# Patient Record
Sex: FEMALE | Race: WHITE
[De-identification: names, ages, dates, MRNs, and addresses within clinical notes are randomized per-mention and may not be internally consistent; named-entity substitution may affect disease eponyms.]

---

## 2018-06-09 ENCOUNTER — HOSPITAL ENCOUNTER (EMERGENCY)
Dept: HOSPITAL 25 - ED | Age: 58
Discharge: HOME | End: 2018-06-09
Payer: COMMERCIAL

## 2018-06-09 VITALS — DIASTOLIC BLOOD PRESSURE: 71 MMHG | SYSTOLIC BLOOD PRESSURE: 145 MMHG
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
## 2018-06-09 DIAGNOSIS — S52.511A: ICD-10-CM

## 2018-06-09 DIAGNOSIS — F17.200: ICD-10-CM

## 2018-06-09 DIAGNOSIS — I10: ICD-10-CM
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
## 2018-06-09 DIAGNOSIS — S52.572A: Primary | ICD-10-CM

## 2018-06-09 PROCEDURE — 93005 ELECTROCARDIOGRAM TRACING: CPT

## 2018-06-09 PROCEDURE — 99283 EMERGENCY DEPT VISIT LOW MDM: CPT

## 2018-06-09 NOTE — ED
Upper Extremity Pain





- HPI Summary


HPI Summary: 


57F presents with bilateral wrist injury today.  She was walking with his son 

and hand when she tripped and fell on her bilateral wrists.  There is deformity 

to right wrist.  She denies any numbness or tingling.  She denies any previous 

fracture to the area.  She also states she has minimal pain to her left wrist 

but has full range of motion..  She denies any elbow pain.  She denies any 

other injury.  She is right-handed.  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 

typing.  She has a history of high blood pressure.  She denies any chest pain 

shortness breath or headache.








- History of Current Complaint


Chief Complaint: EDExtremityUpper


Stated Complaint: FALL/WRIST INJURY


Time Seen by Provider: 06/09/18 17:37





- Allergies/Home Medications


Allergies/Adverse Reactions: 


 Allergies











Allergy/AdvReac Type Severity Reaction Status Date / Time


 


No Known Allergies Allergy   Verified 06/09/18 17:56











Home Medications: 


 Home Medications





Aspirin EC TAB* [Ecotrin EC Low Dose 81 MG*] 81 mg PO DAILY 06/09/18 [History 

Confirmed 06/09/18]


Cyanocobalamin TAB* [Vitamin B12 TAB*] 500 mcg PO DAILY 06/09/18 [History 

Confirmed 06/09/18]


Multivitamins/Minerals TAB* [Theragran/minerals TAB*] 1 tab PO DAILY 06/09/18 [

History Confirmed 06/09/18]


Omega-3 Fatty Acids (Nf) [Fish Oil (NF)] 1,000 mg PO DAILY 06/09/18 [History 

Confirmed 06/09/18]


Valsartan/HCTZ 320/12.5(NF) [Diovan Hct 320/12.5(NF)] 1 tab PO DAILY 06/09/18 [

History Confirmed 06/09/18]


amLODIPine TAB* [Norvasc 5 mg TAB*] 10 mg PO DAILY 06/09/18 [History Confirmed 

06/09/18]


hydrALAZINE TAB* [Apresoline TAB*] 10 mg PO BID 06/09/18 [History Confirmed 06/ 09/18]











PMH/Surg Hx/FS Hx/Imm Hx


Endocrine/Hematology History: 


   Denies: Hx Anticoagulant Therapy


Cardiovascular History: Reports: Hx Hypertension


Infectious Disease History: No


Infectious Disease History: 


   Denies: Traveled Outside the [place] in Last 30 Days





- Family History


Known Family History: Positive: Hypertension





- Social History


Alcohol Use: Occasionally


Substance Use Type: Reports: None


Smoking Status (MU): Light Every Day Tobacco Smoker





Review of Systems


Negative: Fever


Negative: Chest Pain


Negative: Shortness Of Breath


Positive: Myalgia - bilateral wrist pain


All Other Systems Reviewed And Are Negative: Yes





Physical Exam


Triage Information Reviewed: Yes


Vital Signs On Initial Exam: 


 Initial Vitals











Temp Pulse Resp BP Pulse Ox


 


 99.0 F   119   20   211/77   98 


 


 06/09/18 17:18  06/09/18 17:18  06/09/18 17:18  06/09/18 17:18  06/09/18 17:18











Vital Signs Reviewed: Yes


Appearance: Positive: Well-Appearing


Skin: Positive: Warm, Dry


Head/Face: Positive: Normal Head/Face Inspection


Eyes: Positive: Normal, Conjunctiva Clear


Respiratory/Lung Sounds: Positive: Clear to Auscultation, Breath Sounds Present


Cardiovascular: Positive: Normal, RRR


Musculoskeletal: Positive: Limited @ - right wrist, Other - good pulses, 

capillary refill<2 secs, deformity to right wrist, tenderness to left wrist 

with no deformity and full ROM


Neurological: Positive: Normal


Psychiatric: Positive: Normal





Procedures





- Splinting


  ** Left


Location: left wrist


Hand-Made Type: orthoglass


Splint: sugar-tong


Pre-Proc Neuro Vasc Exam: normal


Post-Proc Neuro Vasc Exam: normal





  ** right wrist


Location: right wrist


Hand-Made Type: orthoglass


Splint: sugar-tong


Pre-Proc Neuro Vasc Exam: normal


Post-Proc Neuro Vasc Exam: normal





- Joint Reduction


  ** Right


Joint Reduction Site: wrist (R)


Conscious Sedation: No


Reduction Attempts: 1 - finger traps and manual reduction attempted


Pre-Procedure NV Exam: Yes


Post Joint Reduction Film: joint not reduced - improved a little





Diagnostics





- Vital Signs


 Vital Signs











  Temp Pulse Resp BP Pulse Ox


 


 06/09/18 17:18  99.0 F  119  20  211/77  98














- Laboratory


Lab Statement: Any lab studies that have been ordered have been reviewed, and 

results considered in the medical decision making process.





- Radiology


  ** right wrist


Xray Interpretation: Positive (See Comments) - IMPRESSION: Distal radius and 

ulnar fractures.


Radiology Interpretation Completed By: Radiologist





  ** left wrist


Xray Interpretation: Positive (See Comments) - radius fracture


Radiology Interpretation Completed By: Radiologist





- EKG


  ** No standard instances


EKG Rhythm: Sinus Tachycardia


ST Segment: Normal


EKG Interpretation: sinus tachycardia





Course/Dx





- Course


Course Of Treatment: 57F presents with bilateral wrist injury today.  She was 

walking with his son and hand when she tripped and fell on her bilateral 

wrists.  There is deformity to right wrist.  She denies any numbness or 

tingling.  She denies any previous fracture to the area.  She also states she 

has minimal pain to her left wrist but has full range of motion..  She denies 

any elbow pain.  She denies any other injury.  She is right-handed.  She works 

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  She has a history of high blood pressure.  

She denies any chest pain shortness breath or headache.  Initial presentation 

patient was tachycardic and had a high blood pressure.  EKG was performed and 

shows sinus tachycardia.  In the course the ED patient's blood pressure came 

down was likely due to pain.  On exam has deformity noted to right wrist.  

Neurovascular intact on both wrists.  Has full range of motion of left versus 

pain.  X-ray shows displaced fracture of right radius.  Spoke with Dr. Foster 

and states patient will likely need surgery.  Place in finger traps and 

attempted manual reduction after hematoma block and only minimal reduction 

occurred.  X-ray of left radius shows a nondisplaced fracture.  Placed both 

wrists in splint.  Will follow-up with orthopedic.  Told to practice rice.  

Patient understands the plan.





- Diagnoses


Differential Diagnosis/HQI/PQRI: Positive: Fracture (Closed), Strain, Sprain


Provider Diagnoses: 


 Wrist fracture, bilateral, Hypertension








Discharge





- Sign-Out/Discharge


Documenting (check all that apply): Discharge/Admit/Transfer





- Discharge Plan


Condition: Good


Disposition: HOME


Prescriptions: 


oxyCODONE/Acetamin 5/325 MG* [Percocet 5/325 TAB*] 1 tab PO Q6H PRN #16 tab MDD 

4


 PRN Reason: Pain


Patient Education Materials:  Wrist Fracture in Adults (ED)


Forms:  *Work Release


Referrals: 


Keyur PAREDES,Pam DAMON [Primary Care Provider] - 


Giovanni Foster MD [Medical Doctor] - 


Additional Instructions: 


Call office Monday to follow up Tuesday or wed this week


Keep elbow in sling as needed


Keep splint on area and keep dry


Use ibuprofen for pain every 6 hours and use narcotic for breakthrough pain 

every 6 hours


Ice, elevate   


Return to ED if develop any new or worsening symptoms





- Billing Disposition and Condition


Condition: GOOD


Disposition: Home

## 2018-06-09 NOTE — RAD
Indication: Visible deformity, pain, decreased range of motion post fall on outstretched

RIGHT hand.



Comparison: No relevant prior exams available on the [facility] PACS for comparison.



Technique: AP and lateral views RIGHT radius and ulna. AP, lateral, and oblique views

RIGHT wrist.



Report: Bone density appears decreased.



Comminuted impacted fracture involving the distal metaphysis through articular surface of

the distal radius with one bone width volar displacement. Associated grossly nondisplaced

ulnar styloid avulsion. Negative for additional more proximal fracture of the radius or

ulna. The carpus is displaced volar with the dominant distal radial articular fragment.

Negative for dislocation. Associated soft tissue swelling most marked over the dorsal and

ulnar aspects.



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



IMPRESSION:  Distal radius and ulnar fractures.

## 2018-06-09 NOTE — RAD
Indication: Post reduction RIGHT wrist fracture.



Comparison: Prereduction exam of the same date.



Technique: AP and lateral views RIGHT wrist.



REPORT AND 

IMPRESSION:   Cast/splint limits image quality. Mild interval decrease in magnitude of

volar displacement of the dominant distal radial fracture fragment compared with the

prereduction exam. Only minimal displacement of the ulnar styloid avulsion fracture

without change.

## 2018-06-09 NOTE — RAD
INDICATION: Pain post fall. Contralateral RIGHT distal radius and ulna fracture documented

on radiographs of the same date.



COMPARISON: No relevant prior exams available on the [facility] [place] for comparison.



TECHNIQUE: AP, lateral, and oblique views LEFT wrist.



REPORT AND IMPRESSION:   Mildly impacted fracture of the distal radius with

intra-articular extension. Overlying soft tissue swelling. Negative for additional

fracture within the field-of-view. Normal articular alignment.

## 2018-06-09 NOTE — RAD
Indication: Visible deformity, pain, decreased range of motion post fall on outstretched

RIGHT hand.



Comparison: No relevant prior exams available on the [facility] PACS for comparison.



Technique: AP and lateral views RIGHT radius and ulna. AP, lateral, and oblique views

RIGHT wrist.



Report: Bone density appears decreased.



Comminuted impacted fracture involving the distal metaphysis through articular surface of

the distal radius with one bone width volar displacement. Associated grossly nondisplaced

ulnar styloid avulsion. Negative for additional more proximal fracture of the radius or

ulna. The carpus is displaced volar with the dominant distal radial articular fragment.

Negative for dislocation. Associated soft tissue swelling most marked over the dorsal and

ulnar aspects.



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



IMPRESSION:  Distal radius and ulnar fractures.